# Patient Record
Sex: FEMALE | Race: WHITE | NOT HISPANIC OR LATINO | Employment: OTHER | URBAN - METROPOLITAN AREA
[De-identification: names, ages, dates, MRNs, and addresses within clinical notes are randomized per-mention and may not be internally consistent; named-entity substitution may affect disease eponyms.]

---

## 2017-06-19 ENCOUNTER — APPOINTMENT (OUTPATIENT)
Dept: LAB | Facility: CLINIC | Age: 82
End: 2017-06-19
Payer: MEDICARE

## 2017-06-19 ENCOUNTER — TRANSCRIBE ORDERS (OUTPATIENT)
Dept: LAB | Facility: CLINIC | Age: 82
End: 2017-06-19

## 2017-06-19 DIAGNOSIS — M10.00 GOUTY NEURITIS (HCC): ICD-10-CM

## 2017-06-19 DIAGNOSIS — E55.9 UNSPECIFIED VITAMIN D DEFICIENCY: ICD-10-CM

## 2017-06-19 DIAGNOSIS — M79.10 MYALGIA: ICD-10-CM

## 2017-06-19 DIAGNOSIS — G63 GOUTY NEURITIS (HCC): ICD-10-CM

## 2017-06-19 DIAGNOSIS — E78.2 MIXED HYPERLIPIDEMIA: ICD-10-CM

## 2017-06-19 DIAGNOSIS — E03.4 IDIOPATHIC ATROPHIC HYPOTHYROIDISM: ICD-10-CM

## 2017-06-19 DIAGNOSIS — E78.2 MIXED HYPERLIPIDEMIA: Primary | ICD-10-CM

## 2017-06-19 LAB
25(OH)D3 SERPL-MCNC: 32.4 NG/ML (ref 30–100)
ALBUMIN SERPL BCP-MCNC: 3.8 G/DL (ref 3.5–5)
ALP SERPL-CCNC: 47 U/L (ref 46–116)
ALT SERPL W P-5'-P-CCNC: 22 U/L (ref 12–78)
ANION GAP SERPL CALCULATED.3IONS-SCNC: 9 MMOL/L (ref 4–13)
AST SERPL W P-5'-P-CCNC: 35 U/L (ref 5–45)
BILIRUB SERPL-MCNC: 0.78 MG/DL (ref 0.2–1)
BUN SERPL-MCNC: 14 MG/DL (ref 5–25)
CALCIUM SERPL-MCNC: 10 MG/DL (ref 8.3–10.1)
CHLORIDE SERPL-SCNC: 102 MMOL/L (ref 100–108)
CHOLEST SERPL-MCNC: 232 MG/DL (ref 50–200)
CK MB SERPL-MCNC: 1.3 % (ref 0–2.5)
CK MB SERPL-MCNC: 4.1 NG/ML (ref 0–5)
CK SERPL-CCNC: 304 U/L (ref 26–192)
CO2 SERPL-SCNC: 28 MMOL/L (ref 21–32)
CREAT SERPL-MCNC: 1.22 MG/DL (ref 0.6–1.3)
ERYTHROCYTE [SEDIMENTATION RATE] IN BLOOD: 19 MM/HOUR (ref 0–20)
GFR SERPL CREATININE-BSD FRML MDRD: 42.5 ML/MIN/1.73SQ M
GLUCOSE P FAST SERPL-MCNC: 98 MG/DL (ref 65–99)
HDLC SERPL-MCNC: 61 MG/DL (ref 40–60)
LDLC SERPL CALC-MCNC: 139 MG/DL (ref 0–100)
POTASSIUM SERPL-SCNC: 4.6 MMOL/L (ref 3.5–5.3)
PROT SERPL-MCNC: 8 G/DL (ref 6.4–8.2)
SODIUM SERPL-SCNC: 139 MMOL/L (ref 136–145)
TRIGL SERPL-MCNC: 158 MG/DL
TSH SERPL DL<=0.05 MIU/L-ACNC: 1.79 UIU/ML (ref 0.36–3.74)
URATE SERPL-MCNC: 8.3 MG/DL (ref 2–6.8)

## 2017-06-19 PROCEDURE — 80053 COMPREHEN METABOLIC PANEL: CPT

## 2017-06-19 PROCEDURE — 82550 ASSAY OF CK (CPK): CPT

## 2017-06-19 PROCEDURE — 82553 CREATINE MB FRACTION: CPT

## 2017-06-19 PROCEDURE — 80061 LIPID PANEL: CPT

## 2017-06-19 PROCEDURE — 85652 RBC SED RATE AUTOMATED: CPT

## 2017-06-19 PROCEDURE — 82306 VITAMIN D 25 HYDROXY: CPT

## 2017-06-19 PROCEDURE — 84443 ASSAY THYROID STIM HORMONE: CPT

## 2017-06-19 PROCEDURE — 36415 COLL VENOUS BLD VENIPUNCTURE: CPT

## 2017-06-19 PROCEDURE — 84550 ASSAY OF BLOOD/URIC ACID: CPT

## 2021-03-25 ENCOUNTER — IMMUNIZATIONS (OUTPATIENT)
Dept: FAMILY MEDICINE CLINIC | Facility: HOSPITAL | Age: 86
End: 2021-03-25

## 2021-03-25 DIAGNOSIS — Z23 ENCOUNTER FOR IMMUNIZATION: Primary | ICD-10-CM

## 2021-03-25 PROCEDURE — 91300 SARS-COV-2 / COVID-19 MRNA VACCINE (PFIZER-BIONTECH) 30 MCG: CPT

## 2021-03-25 PROCEDURE — 0001A SARS-COV-2 / COVID-19 MRNA VACCINE (PFIZER-BIONTECH) 30 MCG: CPT

## 2021-04-16 ENCOUNTER — IMMUNIZATIONS (OUTPATIENT)
Dept: FAMILY MEDICINE CLINIC | Facility: HOSPITAL | Age: 86
End: 2021-04-16

## 2021-04-16 DIAGNOSIS — Z23 ENCOUNTER FOR IMMUNIZATION: Primary | ICD-10-CM

## 2021-04-16 PROCEDURE — 0002A SARS-COV-2 / COVID-19 MRNA VACCINE (PFIZER-BIONTECH) 30 MCG: CPT

## 2021-04-16 PROCEDURE — 91300 SARS-COV-2 / COVID-19 MRNA VACCINE (PFIZER-BIONTECH) 30 MCG: CPT

## 2022-02-08 ENCOUNTER — NURSING HOME VISIT (OUTPATIENT)
Dept: GERIATRICS | Facility: OTHER | Age: 87
End: 2022-02-08
Payer: MEDICARE

## 2022-02-08 DIAGNOSIS — C55 UTERINE CARCINOMA (HCC): ICD-10-CM

## 2022-02-08 DIAGNOSIS — N95.0 POSTMENOPAUSAL BLEEDING: Primary | ICD-10-CM

## 2022-02-08 DIAGNOSIS — D62 ACUTE BLOOD LOSS ANEMIA: ICD-10-CM

## 2022-02-08 DIAGNOSIS — N17.9 AKI (ACUTE KIDNEY INJURY) (HCC): ICD-10-CM

## 2022-02-08 DIAGNOSIS — E87.1 HYPONATREMIA: ICD-10-CM

## 2022-02-08 DIAGNOSIS — G61.0 GUILLAIN-BARRE SYNDROME (HCC): ICD-10-CM

## 2022-02-08 DIAGNOSIS — I10 ESSENTIAL HYPERTENSION: ICD-10-CM

## 2022-02-08 DIAGNOSIS — E03.9 HYPOTHYROIDISM, UNSPECIFIED TYPE: ICD-10-CM

## 2022-02-08 PROBLEM — A41.9 SEPSIS (HCC): Status: ACTIVE | Noted: 2022-01-29

## 2022-02-08 PROBLEM — R33.9 URINARY RETENTION: Status: ACTIVE | Noted: 2022-01-27

## 2022-02-08 PROBLEM — N18.9 CKD (CHRONIC KIDNEY DISEASE): Status: ACTIVE | Noted: 2022-02-07

## 2022-02-08 PROBLEM — D63.8 ANEMIA IN OTHER CHRONIC DISEASES CLASSIFIED ELSEWHERE: Status: ACTIVE | Noted: 2022-01-04

## 2022-02-08 PROBLEM — M79.2 NEUROPATHIC PAIN: Status: ACTIVE | Noted: 2021-12-15

## 2022-02-08 PROBLEM — S32.010B: Status: ACTIVE | Noted: 2022-01-05

## 2022-02-08 PROBLEM — I27.20 PULMONARY HYPERTENSION (HCC): Status: ACTIVE | Noted: 2022-02-07

## 2022-02-08 PROBLEM — N30.00 ACUTE CYSTITIS WITHOUT HEMATURIA: Status: ACTIVE | Noted: 2022-01-29

## 2022-02-08 PROCEDURE — 99306 1ST NF CARE HIGH MDM 50: CPT | Performed by: INTERNAL MEDICINE

## 2022-02-08 RX ORDER — POLYETHYLENE GLYCOL 3350 17 G/17G
17 POWDER, FOR SOLUTION ORAL DAILY
COMMUNITY
Start: 2022-02-08

## 2022-02-08 RX ORDER — GABAPENTIN 100 MG/1
1 CAPSULE ORAL EVERY 12 HOURS
COMMUNITY
Start: 2022-01-11

## 2022-02-08 RX ORDER — ACETAMINOPHEN 500 MG
500 TABLET ORAL EVERY 4 HOURS PRN
COMMUNITY
Start: 2022-02-07

## 2022-02-08 RX ORDER — LANOLIN ALCOHOL/MO/W.PET/CERES
100 CREAM (GRAM) TOPICAL DAILY
COMMUNITY
Start: 2022-02-07 | End: 2022-03-09

## 2022-02-08 RX ORDER — AMOXICILLIN 250 MG
2 CAPSULE ORAL
COMMUNITY
Start: 2022-02-07 | End: 2022-03-09

## 2022-02-08 RX ORDER — SOD.CHLORID/POTASSIUM CHLORIDE 287-180-15
904 TABLET ORAL DAILY
COMMUNITY
Start: 2021-12-30

## 2022-02-08 RX ORDER — UBIDECARENONE 75 MG
1 CAPSULE ORAL DAILY
COMMUNITY

## 2022-02-08 RX ORDER — LEVOTHYROXINE SODIUM 0.07 MG/1
75 TABLET ORAL DAILY
COMMUNITY

## 2022-02-08 RX ORDER — AMLODIPINE BESYLATE 5 MG/1
5 TABLET ORAL 2 TIMES DAILY
COMMUNITY
Start: 2022-02-07 | End: 2023-02-07

## 2022-02-08 NOTE — ASSESSMENT & PLAN NOTE
S/p hysterectomy   S/p transfusion  Will monitor CBC   Continue monitoring for signs of bleeding   Surgical sight care

## 2022-02-08 NOTE — ASSESSMENT & PLAN NOTE
Symptoms have improved from Dec  2021 admission with the diagnosis but still some residual weakness left as per report  Rehab consult

## 2022-02-08 NOTE — ASSESSMENT & PLAN NOTE
S/p hysterectomy   Continue follow up with GYN  Clinical stage from 1/26/22 : FIGO stage IVB, calculated stage IIIB

## 2022-02-08 NOTE — PROGRESS NOTES
[facility] Nursing home notes  SHORT TERM REHAB       NAME: Danny Sauceda  AGE: 82 y o  SEX: female    DATE OF ENCOUNTER: 2/8/2022    Assessment and Plan   Postmenopausal bleeding  S/p hysterectomy   S/p transfusion  Will monitor CBC   Continue monitoring for signs of bleeding   Surgical sight care    Uterine carcinoma [facility])  S/p hysterectomy   Continue follow up with GYN  Clinical stage from 1/26/22 : FIGO stage IVB, calculated stage IIIB    Acute blood loss anemia  S/p transfusion  Hb on discharge was 11 2   Continue monitoring     Guillain-Winchester syndrome (HCC)  Symptoms have improved from Dec  2021 admission with the diagnosis but still some residual weakness left as per report  Rehab consult     Essential hypertension  BP slightly elevated, maybe due to pain  Will continue current meds  Continue monitoring BP    JOSE ANGEL (acute kidney injury) ([facility] [address] )  Improved on discharge  Repeat and monitor BMP    Hyponatremia  Improved on discharge  Continue monitoring     Hypothyroidism  Continue synthroid      Chief Complaint     No new complaints    History of Present Illness     83 yo female seen for admission to [facility] after a hospitalization  Patient needed some cueing to get history  She did report that she had hysterectomy and also reports having transfusion after cueing  Called patient son who reported that she went to the hospital due to vaginal bleeding and had hysterectomy and transfusion  She was also managed for UTI  He also reported that she also recently was managed for Guillian Winchester syndrome  Reviewed labs and imaging reports from the hospital records       PMHx     Past Medical History:   Diagnosis Date    Disease of thyroid gland     Guillain Barré syndrome ([address] )     Hypertension     Uterine mass      Past Surgical History:   Procedure Laterality Date    CHOLECYSTECTOMY       Family History   Problem Relation Age of Onset    Heart attack Father      Social History Socioeconomic History    Marital status:      Spouse name: None    Number of children: None    Years of education: None    Highest education level: None   Occupational History    None   Tobacco Use    Smoking status: Former Smoker    Smokeless tobacco: Former User   Substance and Sexual Activity    Alcohol use: Yes    Drug use: Never    Sexual activity: None   Other Topics Concern    None   Social History Narrative    None     Social Determinants of Health     Financial Resource Strain: Not on file   Food Insecurity: Not on file   Transportation Needs: Not on file   Physical Activity: Not on file   Stress: Not on file   Social Connections: Not on file   Intimate Partner Violence: Not on file   Housing Stability: Not on file     No Known Allergies    Review of Systems     Some pain in the abdomen with movement   Constipation   All other review of system negative        Objective   Vital signs:  BP: 152/70  HR: 80  RR: 18  TEMP: 97 9F  SAT : 94% on RA    Done with nursing staff present with patient   PHYSICAL EXAM:  GENERAL: no acute distress  SKIN: no rash, no cyanosis, surgical sight looks clean   HEENT: normocephalic, atraumatic  LUNGS: expanded equally, no chest tenderness   HEART: no JVD, bilateral lower extremity edema trace  ABDOMEN: soft non tender non distended   MUSCULOSKELETAL:  moves all extremities, ROM within normal  NEUROLOGY: awake, alert, Ox3, able to recall 2/3 object  EOMI intact  PSYCH: cooperative, pleasant         Pertinent Laboratory/Diagnostic Studies:  Recent labs and diagnostic tests reviewed in nursing home EMR    Current Medications   Medications reviewed and signed off on nursing home chart

## 2022-02-10 ENCOUNTER — NURSING HOME VISIT (OUTPATIENT)
Dept: GERIATRICS | Facility: OTHER | Age: 87
End: 2022-02-10
Payer: MEDICARE

## 2022-02-10 DIAGNOSIS — E03.9 HYPOTHYROIDISM, UNSPECIFIED TYPE: ICD-10-CM

## 2022-02-10 DIAGNOSIS — N95.0 POSTMENOPAUSAL BLEEDING: Primary | ICD-10-CM

## 2022-02-10 DIAGNOSIS — K59.01 SLOW TRANSIT CONSTIPATION: ICD-10-CM

## 2022-02-10 DIAGNOSIS — N18.31 STAGE 3A CHRONIC KIDNEY DISEASE (HCC): ICD-10-CM

## 2022-02-10 DIAGNOSIS — I10 ESSENTIAL HYPERTENSION: ICD-10-CM

## 2022-02-10 DIAGNOSIS — G61.0 GUILLAIN-BARRE SYNDROME (HCC): ICD-10-CM

## 2022-02-10 PROCEDURE — 99309 SBSQ NF CARE MODERATE MDM 30: CPT | Performed by: NURSE PRACTITIONER

## 2022-02-10 NOTE — PROGRESS NOTES
[facility]  [address] [address]  ([address] [facility]) [address] [facility]   Progress Note  POS 31        NAME: Steven Hanks  AGE: 82 y o  SEX: female  :  1935  DATE OF ENCOUNTER: 2/10/2022     Chief Complaint   Patient seen and examined for follow up on chronic conditions  History of Present Illness      The patient is an 82year old female with multiple comorbidities including but not limited to HTN, CKD, hypothyroidism, and uterine cancer  She is seen today at [facility] for rehabilitation following a hospitalization for abnormal vaginal bleeding  While admitted to the hospital, she underwent a SHARI BSO and pelvic mass resection  Today upon assessment, the patient is resting comfortably in her chair with a complaint of mild abdominal tenderness where her incision is located  Otherwise, the patient's assessment is benign  She denies any headaches, chest pain or SOB, or any nausea or vomiting  Upon examination, it is noted that the patients belly is soft although she does not recall having a BM for a few days  Per nursing, the patient did have a large BM yesterday  The patient is on a bowel regimen  The patient also has a saucedo catheter in place from a failed voiding trial at the hospital  The patient will follow up with urology for further evaluation  Her incision site is clean, dry, and intact and she is scheduled for staples removal on   The patient comes from home alone with a supportive family  The patients grandson was present in the room upon assessment  The following portions of the patient's history were reviewed and updated as appropriate: allergies, current medications, past family history, past medical history, past social history, past surgical history and problem list      Review of Systems      A review of systems was performed  All negative, except as per HPI       History      Past Medical History:   Diagnosis Date    Disease of thyroid gland      Guillain Barré syndrome ([facility] [address] )      Hypertension      Uterine mass        Past Surgical History:   Procedure Laterality Date    CHOLECYSTECTOMY                Family History   Problem Relation Age of Onset    Heart attack Father              Social History      Socioeconomic History    Marital status:        Spouse name: Not on file    Number of children: Not on file    Years of education: Not on file    Highest education level: Not on file   Occupational History    Not on file   Tobacco Use    Smoking status: Former Smoker    Smokeless tobacco: Former User   Substance and Sexual Activity    Alcohol use:  Yes    Drug use: Never    Sexual activity: Not on file   Other Topics Concern    Not on file   Social History Narrative    Not on file            Social Determinants of Health      Financial Resource Strain: Not on file   Food Insecurity: Not on file   Transportation Needs: Not on file   Physical Activity: Not on file   Stress: Not on file   Social Connections: Not on file   Intimate Partner Violence: Not on file   Housing Stability: Not on file      No Known Allergies     Objective      Vital Signs  BP: 128/65   HR: 82   T: 97 8 F    RR: 20      O2Sat: 97% RA    W: 149 3lbs  General: NAD, Well Nourished, Well Developed  Oral: Oropharynx Moist and Clear  Neck: Supple, +ROM  CV: S1, S2, normal rate, regular rhythm, no murmur appreciated  Pulmonary: Lung sounds clear to air, no wheezing, rhonchi, rales  Abdominal:BS + x4 in all quadrants, soft, no mass, mild tenderness with coughing  Extremities: No edema, +ROM, +Strength  Skin: Warm, Dry, no lesions, no rash, no erythema present, ecchymosis present to lower abdomen with surgical incision clean, dry, intact  Neurological: No cranial nerve deficits noted  Psych: Alert and oriented times 3, no mood, no affect, good judgment     Pertinent Laboratory/Diagnostic Studies:    CBC WITH DIFF     HEMOGLOBIN 10 2 g/dL 11 5-14 5 L Final            HEMATOCRIT 30 4 % 35 0-43 0 L Final            WBC 6 3 thou/cmm 4 0-10 0  Final            RBC 3 45 mill/cmm 3 70-4 70 L Final            PLATELET COUNT 713 thou/cmm 140-350 H Final            MPV 6 7 fL 7 5-11 3 L Final            MCV 88 fL   Final            MCH 29 5 pg 26 0-34 0  Final            MCHC 33 6 g/dL 32 0-37 0  Final            RDW 15 4 % 12 0-16 0  Final            DIFFERENTIAL TYPE AUTO    Final            ABSOLUTE NEUT 4 4 thou/cmm 1 8-7 8  Final            ABSOLUTE LYMPH 1 1 thou/cmm 1 0-3 0  Final            ABSOLUTE MONO 0 5 thou/cmm 0 3-1 0  Final            ABSOLUTE EOS 0 2 thou/cmm 0 0-0 5  Final            ABSOLUTE BASO 0 0 thou/cmm 0 0-0 1  Final            NEUTROPHILS 70 %   Final            LYMPHOCYTES 17 %   Final            MONOCYTES 9 %   Final            EOSINOPHILS 3 %   Final            BASOPHILS 1 %   Final          COMP METAB PANEL     GLUCOSE 98 mg/dL 65-99  Final            BUN 13 mg/dL 7-25  Final            CREATININE 0 92 mg/dL 0 40-1 10  Final            SODIUM 138 mmol/L 135-145  Final            POTASSIUM 3 8 mmol/L 3 5-5 2  Final            CHLORIDE 106 mmol/L 100-109  Final            CARBON DIOXIDE 24 mmol/L 23-31  Final            CALCIUM 9 0 mg/dL 8 5-10 1  Final            ALKALINE PHOSPHATASE 39 U/L   Final            ALBUMIN 2 4 g/dL 3 5-4 8 L Final            BILIRUBIN,TOTAL 0 6 mg/dL 0 2-1 0  Final     Use of this assay is not recommended for patients undergoing treatment  with eltrombopag due to the potential for falsely elevated results         PROTEIN, TOTAL 5 5 g/dL 6 3-8 3 L Final            AST 16 U/L <41  Final            ALT 11 U/L <56  Final            ANION GAP 8  3-11  Final            eGFR, NON  AM 57  >60 L Final            eGFR,  AMER 66  >60  Final            eGFR COMMENT (Note)    Final     Units: mL/min per 1 73 square meters                                           Normal Function or Mild Renal    Disease (if clinically at risk): >or=60  Moderately Decreased:                30-59  Severely Decreased:                  15-29  Renal Failure:                         <15                                           Please note that the eGFR is based on the CKD-EPI calculation, and is  not intended to be used for drug dosing  Note: Calculated GFR may not be an accurate indicator of renal  function if the patient's renal function is not in a steady state  Current Medications      Current Medications Reviewed and updated in Nursing Home EMR       Assessment and Plan   Uterine cancer with vaginal bleeding   S/p SHARI BSO and pelvic mass resection   Patient with abdominal incision clean, dry, intact with staples planned for removal 2/17   Minimal complaint of abdominal tenderness   Continue tylenol PRN   Follow up with OBGYN    HTN   /65, stable and controlled   Continue amlodipine at current dose   Monitor BPs as ordered   Electrolytes and renal function within normal range    CKD   Kidney function within normal range   Encourage fluid intake   Avoid nephrotoxins and hypotension    Hypothyroidism   Continue levothyroxine at current dose   Follow up with TSH levels    Constipation   On bowel regimen- miralax and senna daily   Continue prn bowel regime   Large BM noted yesterday       Guillain-Sharon syndrome   Left sided residual weakness (December 2021)   Continue PT/OT   Maintain fall precautions    George Lizarraga, [address] [place] [address]  02/10/2022

## 2022-02-14 ENCOUNTER — NURSING HOME VISIT (OUTPATIENT)
Dept: GERIATRICS | Facility: OTHER | Age: 87
End: 2022-02-14
Payer: MEDICARE

## 2022-02-14 DIAGNOSIS — G61.0 GUILLAIN-BARRE SYNDROME (HCC): ICD-10-CM

## 2022-02-14 DIAGNOSIS — K59.01 SLOW TRANSIT CONSTIPATION: ICD-10-CM

## 2022-02-14 DIAGNOSIS — N18.31 STAGE 3A CHRONIC KIDNEY DISEASE (HCC): ICD-10-CM

## 2022-02-14 DIAGNOSIS — N95.0 POSTMENOPAUSAL BLEEDING: Primary | ICD-10-CM

## 2022-02-14 DIAGNOSIS — I10 ESSENTIAL HYPERTENSION: ICD-10-CM

## 2022-02-14 DIAGNOSIS — R33.9 URINARY RETENTION: ICD-10-CM

## 2022-02-14 DIAGNOSIS — E03.9 HYPOTHYROIDISM, UNSPECIFIED TYPE: ICD-10-CM

## 2022-02-14 PROCEDURE — 99309 SBSQ NF CARE MODERATE MDM 30: CPT | Performed by: NURSE PRACTITIONER

## 2022-02-14 NOTE — PROGRESS NOTES
[facility]  [address]  [id number]  ([address] [facility]) [address] [facility]   Progress Note  POS 31      NAME: Maldonado Shi  AGE: 82 y o  SEX: female  :  1935  DATE OF ENCOUNTER: 2022    Chief Complaint   Patient seen and examined for follow up on chronic conditions  History of Present Illness     The patient is an 82year old female with multiple comorbidities including but not limited to HTN, CKD, hypothyroidism, and uterine cancer  She is seen today at [facility] for rehabilitation following a hospitalization for abnormal vaginal bleeding  While admitted to the hospital, she underwent a SHARI BSO and pelvic mass resection  Today upon assessment, the patient is resting comfortably in her chair with no complaints of pain, only mild abdominal soreness where her incision is located  Otherwise, the patient's assessment is benign  She denies any headaches, chest pain or SOB, or any nausea or vomiting  Although not documented, the patient states that she did have a soft BM yesterday after receiving milk of mag  Her abdomen was soft and nontender upon palpation  The patient is on a bowel regimen  The patient also has a saucedo catheter in place from a failed voiding trial at the hospital  The patient will follow up with urology for further evaluation  Her incision site is clean, dry, and intact and she is scheduled for staples removal on   The patient comes from home alone with a supportive family  Overall, the patient is doing well and looking forward to getting home  The following portions of the patient's history were reviewed and updated as appropriate: allergies, current medications, past family history, past medical history, past social history, past surgical history and problem list     Review of Systems     A review of systems was performed  All negative, except as per HPI      History     Past Medical History:   Diagnosis Date    Disease of thyroid gland     Guillain Barré syndrome ([facility] [address] )     Hypertension     Uterine mass      Past Surgical History:   Procedure Laterality Date    CHOLECYSTECTOMY       Family History   Problem Relation Age of Onset    Heart attack Father      Social History     Socioeconomic History    Marital status:      Spouse name: Not on file    Number of children: Not on file    Years of education: Not on file    Highest education level: Not on file   Occupational History    Not on file   Tobacco Use    Smoking status: Former Smoker    Smokeless tobacco: Former User   Substance and Sexual Activity    Alcohol use:  Yes    Drug use: Never    Sexual activity: Not on file   Other Topics Concern    Not on file   Social History Narrative    Not on file     Social Determinants of Health     Financial Resource Strain: Not on file   Food Insecurity: Not on file   Transportation Needs: Not on file   Physical Activity: Not on file   Stress: Not on file   Social Connections: Not on file   Intimate Partner Violence: Not on file   Housing Stability: Not on file     No Known Allergies    Objective     Vital Signs  BP: 147/72  HR: 69   T: 98 F    RR: 18      O2Sat: 97% RA    W: 144 2lbs  General: NAD, Well Nourished, Well Developed  Oral: Oropharynx Moist and Clear  Neck: Supple, +ROM  CV: S1, S2, normal rate, regular rhythm, no murmur appreciated  Pulmonary: Lung sounds clear to air, no wheezing, rhonchi, rales  Abdominal:BS + x4 in all quadrants, soft, no mass, no tenderness  Extremities: B/l LE trace edema, +ROM, +Strength  Skin: Warm, Dry, no lesions, no rash, no erythema present, no ecchymosis present, lower abdomen surgical incision with staples clean, dry, intact  Neurological: CN 2-12 intact, PERRLA  Psych: Alert and oriented times 3, no mood, no affect, good judgement     Pertinent Laboratory/Diagnostic Studies:    CBC WITH DIFF     HEMOGLOBIN 10 2 g/dL 11 5-14 5 L Final            HEMATOCRIT 30 4 % 35 0-43 0 L Final            WBC 6 3 thou/cmm 4 0-10 0  Final            RBC 3 45 mill/cmm 3 70-4 70 L Final            PLATELET COUNT 929 thou/cmm 140-350 H Final            MPV 6 7 fL 7 5-11 3 L Final            MCV 88 fL   Final            MCH 29 5 pg 26 0-34 0  Final            MCHC 33 6 g/dL 32 0-37 0  Final            RDW 15 4 % 12 0-16 0  Final            DIFFERENTIAL TYPE AUTO    Final            ABSOLUTE NEUT 4 4 thou/cmm 1 8-7 8  Final            ABSOLUTE LYMPH 1 1 thou/cmm 1 0-3 0  Final            ABSOLUTE MONO 0 5 thou/cmm 0 3-1 0  Final            ABSOLUTE EOS 0 2 thou/cmm 0 0-0 5  Final            ABSOLUTE BASO 0 0 thou/cmm 0 0-0 1  Final            NEUTROPHILS 70 %   Final            LYMPHOCYTES 17 %   Final            MONOCYTES 9 %   Final            EOSINOPHILS 3 %   Final            BASOPHILS 1 %   Final          COMP METAB PANEL     GLUCOSE 98 mg/dL 65-99  Final            BUN 13 mg/dL 7-25  Final            CREATININE 0 92 mg/dL 0 40-1 10  Final            SODIUM 138 mmol/L 135-145  Final            POTASSIUM 3 8 mmol/L 3 5-5 2  Final            CHLORIDE 106 mmol/L 100-109  Final            CARBON DIOXIDE 24 mmol/L 23-31  Final            CALCIUM 9 0 mg/dL 8 5-10 1  Final            ALKALINE PHOSPHATASE 39 U/L   Final            ALBUMIN 2 4 g/dL 3 5-4 8 L Final            BILIRUBIN,TOTAL 0 6 mg/dL 0 2-1 0  Final     Use of this assay is not recommended for patients undergoing treatment  with eltrombopag due to the potential for falsely elevated results         PROTEIN, TOTAL 5 5 g/dL 6 3-8 3 L Final            AST 16 U/L <41  Final            ALT 11 U/L <56  Final            ANION GAP 8  3-11  Final            eGFR, NON  AM 57  >60 L Final            eGFR,  AMER 66  >60  Final            eGFR COMMENT (Note)    Final     Units: mL/min per 1 73 square meters                                           Normal Function or Mild Renal    Disease (if clinically at risk):  >or=60  Moderately Decreased: 30-59  Severely Decreased:                  15-29  Renal Failure:                         <15                                           Please note that the eGFR is based on the CKD-EPI calculation, and is  not intended to be used for drug dosing  Note: Calculated GFR may not be an accurate indicator of renal  function if the patient's renal function is not in a steady state  Current Medications     Current Medications Reviewed and updated in Nursing Home EMR      Assessment and Plan     Uterine cancer with vaginal bleeding   S/p SHARI BSO and pelvic mass resection   Patient with abdominal incision clean, dry, intact with staples planned for removal 2/17   No complaints of pain, only mild abdominal soreness   Continue tylenol PRN   Follow up with OBGYN    Urinary retention   Failed voiding trial with saucedo catheter while admitted in the hospital   Continue saucedo catheter   Have patient follow up with urology    HTN   /72, stable and controlled   Continue amlodipine at current dose   Monitor BPs as ordered   Electrolytes and renal function within normal range    CKD   Kidney function within normal range   Encourage fluid intake   Avoid nephrotoxins and hypotension    Hypothyroidism   Continue levothyroxine at current dose   Follow up with TSH levels    Constipation   On bowel regimen- miralax and senna daily   PRNs on board, given milk of mag yesterday per patient   Patient states she did have a decent soft BM yesterday although not documented       Guillain-[place] syndrome   Left sided residual weakness (December 2021)   Continue PT/OT   Maintain fall precautions    [facility]  2/14/2022

## 2022-02-18 ENCOUNTER — NURSING HOME VISIT (OUTPATIENT)
Dept: GERIATRICS | Facility: OTHER | Age: 87
End: 2022-02-18
Payer: MEDICARE

## 2022-02-18 DIAGNOSIS — I10 ESSENTIAL HYPERTENSION: ICD-10-CM

## 2022-02-18 DIAGNOSIS — G61.0 GUILLAIN-BARRE SYNDROME (HCC): ICD-10-CM

## 2022-02-18 DIAGNOSIS — N18.31 STAGE 3A CHRONIC KIDNEY DISEASE (HCC): ICD-10-CM

## 2022-02-18 DIAGNOSIS — N95.0 POSTMENOPAUSAL BLEEDING: Primary | ICD-10-CM

## 2022-02-18 DIAGNOSIS — R33.9 URINARY RETENTION: ICD-10-CM

## 2022-02-18 PROCEDURE — 99309 SBSQ NF CARE MODERATE MDM 30: CPT | Performed by: NURSE PRACTITIONER

## 2022-02-18 NOTE — PROGRESS NOTES
[facility]  [address] [facility] [address]  ([address] [facility]) [facility]   Progress Note  POS 31      NAME: Yareli Osorio  AGE: 82 y o  SEX: female  :  1935  DATE OF ENCOUNTER: 2022    Chief Complaint   Patient seen and examined for follow up on chronic conditions  History of Present Illness     Yareli Osorio is a 82year old female patient with HTN, CKD, hypothyroidism, uterine cancer, urinary retention, guillain-barre syndrome, anemia seen and examined today to follow-up on acute and chronic medical conditions in sub-acute rehab at [facility]  Patient is status post hospitalization for vaginal bleeding and had a hysterectomy  She received a blood transfusion and was treated for a UTI  She was also recently treated for Guillian [place] syndrome  Upon examination, patient is sitting in her chair  She is awake, alert and in no acute distress  She recently came back from the Urologist, passed her voiding trial and had her saucedo catheter removed  She denies difficulty urinating, hematuria, fever, chills, chest pain, sob, abdominal pain, nausea, vomiting, diarrhea, headache, dizziness or visual disturbance  She states that she is experiencing mild incisional tenderness that is currently controlled off medications  The following portions of the patient's history were reviewed and updated as appropriate: allergies, current medications, past family history, past medical history, past social history, past surgical history and problem list     Review of Systems     A review of systems was performed  All negative, except as per HPI      History     Past Medical History:   Diagnosis Date    Disease of thyroid gland     Guillain Barré syndrome ([address] )     Hypertension     Uterine mass      Past Surgical History:   Procedure Laterality Date    CHOLECYSTECTOMY       Family History   Problem Relation Age of Onset    Heart attack Father      Social History     Socioeconomic History    Marital status:      Spouse name: Not on file    Number of children: Not on file    Years of education: Not on file    Highest education level: Not on file   Occupational History    Not on file   Tobacco Use    Smoking status: Former Smoker    Smokeless tobacco: Former User   Substance and Sexual Activity    Alcohol use:  Yes    Drug use: Never    Sexual activity: Not on file   Other Topics Concern    Not on file   Social History Narrative    Not on file     Social Determinants of Health     Financial Resource Strain: Not on file   Food Insecurity: Not on file   Transportation Needs: Not on file   Physical Activity: Not on file   Stress: Not on file   Social Connections: Not on file   Intimate Partner Violence: Not on file   Housing Stability: Not on file     No Known Allergies    Objective     Vital Signs  BP: 118/60     HR: 72 T: 97 9     RR: 18  O2Sat: 95% RA W: 144 2 lbs  General: NAD, Well Nourished, Well Developed  Oral: Oropharynx Moist and Clear  CV: S1, S2, normal rate, regular rhythm, no murmur appreciated  Pulmonary: Lung sounds clear to air, no wheezing, rhonchi, rales  Abdominal:BS + x4 in all quadrants, soft, no mass, no tenderness  Extremities: No edema, +ROM, +Strength  Skin: Warm, Dry, no lesions, no rash, no erythema, abdominal incision c/d/i, steri strips present, no drainage present, no ecchymosis present  Neurological: No cranial nerve deficits  Psych: Alert and oriented times 3, no mood, no affect, good judgement    Pertinent Laboratory/Diagnostic Studies:  02/09/2022  HEMOGLOBIN 10 2 g/dL 11 5-14 5 L Final              HEMATOCRIT 30 4 % 35 0-43 0 L Final             WBC 6 3 thou/cmm 4 0-10 0  Final             RBC 3 45 mill/cmm 3 70-4 70 L Final             PLATELET COUNT 309 thou/cmm 140-350 H Final             MPV 6 7 fL 7 5-11 3 L Final             MCV 88 fL   Final             MCH 29 5 pg 26 0-34 0  Final             MCHC 33 6 g/dL 32 0-37 0  Final             RDW 15 4 % 12 0-16 0  Final             DIFFERENTIAL TYPE AUTO    Final             ABSOLUTE NEUT 4 4 thou/cmm 1 8-7 8  Final             ABSOLUTE LYMPH 1 1 thou/cmm 1 0-3 0  Final             ABSOLUTE MONO 0 5 thou/cmm 0 3-1 0  Final             ABSOLUTE EOS 0 2 thou/cmm 0 0-0 5  Final             ABSOLUTE BASO 0 0 thou/cmm 0 0-0 1  Final             NEUTROPHILS 70 %   Final             LYMPHOCYTES 17 %   Final             MONOCYTES 9 %   Final             EOSINOPHILS 3 %   Final             BASOPHILS 1 %   Final           COMP METAB PANEL               GLUCOSE 98 mg/dL 65-99  Final              BUN 13 mg/dL 7-25  Final             CREATININE 0 92 mg/dL 0 40-1 10  Final             SODIUM 138 mmol/L 135-145  Final             POTASSIUM 3 8 mmol/L 3 5-5 2  Final             CHLORIDE 106 mmol/L 100-109  Final             CARBON DIOXIDE 24 mmol/L 23-31  Final             CALCIUM 9 0 mg/dL 8 5-10 1  Final             ALKALINE PHOSPHATASE 39 U/L   Final             ALBUMIN 2 4 g/dL 3 5-4 8 L Final             BILIRUBIN,TOTAL 0 6 mg/dL 0 2-1 0  Final     Use of this assay is not recommended for patients undergoing treatment   with eltrombopag due to the potential for falsely elevated results         PROTEIN, TOTAL 5 5 g/dL 6 3-8 3 L Final             AST 16 U/L <41  Final             ALT 11 U/L <56  Final             ANION GAP 8  3-11  Final             eGFR, NON  AM 57  >60 L Final                       Current Medications     Current Medications Reviewed and updated in Nursing Home EMR      Assessment and Plan     Postmenopausal bleeding  · Status post hysterectomy  · Incision now with steri-strips with no s/s of infection present  · Continue to monitor for infection  · Will monitor CBC    Essential hypertension  · Blood pressure stable and controlled, today 118/60  · Continue amlodipine 5 mg twice daily  · Will monitor electrolytes and renal function    Guillain-[place] syndrome ([address] )  · Diagnosed in December 2021 with residual left sided weakness  · Continue PT/OT services in sub acute rehab  · Maintain fall precautions    CKD (chronic kidney disease)  Lab Results   Component Value Date    EGFR 42 5 06/19/2017    CREATININE 1 22 06/19/2017   · Creatinine stable at 0 92  · Continue to avoid nephrotoxins and hypotension  · Will monitor BMP    Urinary retention  · Status post follow-up with Urology today  · Patient is reported to have passed her voiding trial and saucedo catheter was removed  · Monitor for urinary retention  · Encourage adequate PO intake  · Follow-up with Urology as outpatient      [address] [facility]  02/18/2022

## 2022-02-19 NOTE — ASSESSMENT & PLAN NOTE
· Diagnosed in December 2021 with residual left sided weakness  · Continue PT/OT services in sub acute rehab  · Maintain fall precautions

## 2022-02-19 NOTE — ASSESSMENT & PLAN NOTE
· Blood pressure stable and controlled, today 118/60  · Continue amlodipine 5 mg twice daily  · Will monitor electrolytes and renal function

## 2022-02-19 NOTE — ASSESSMENT & PLAN NOTE
Lab Results   Component Value Date    EGFR 42 5 06/19/2017    CREATININE 1 22 06/19/2017   · Creatinine stable at 0 92  · Continue to avoid nephrotoxins and hypotension  · Will monitor BMP

## 2022-02-19 NOTE — ASSESSMENT & PLAN NOTE
· Status post follow-up with Urology today  · Patient is reported to have passed her voiding trial and saucedo catheter was removed  · Monitor for urinary retention  · Encourage adequate PO intake  · Follow-up with Urology as outpatient

## 2022-02-19 NOTE — ASSESSMENT & PLAN NOTE
· Status post hysterectomy  · Incision now with steri-strips with no s/s of infection present  · Continue to monitor for infection  · Will monitor CBC

## 2022-02-21 ENCOUNTER — NURSING HOME VISIT (OUTPATIENT)
Dept: GERIATRICS | Facility: OTHER | Age: 87
End: 2022-02-21
Payer: MEDICARE

## 2022-02-21 DIAGNOSIS — N95.0 POSTMENOPAUSAL BLEEDING: Primary | ICD-10-CM

## 2022-02-21 DIAGNOSIS — C55 UTERINE CARCINOMA (HCC): ICD-10-CM

## 2022-02-21 DIAGNOSIS — S32.010D OPEN WEDGE COMPRESSION FRACTURE OF L1 VERTEBRA WITH ROUTINE HEALING, SUBSEQUENT ENCOUNTER: ICD-10-CM

## 2022-02-21 DIAGNOSIS — G61.0 GUILLAIN-BARRE SYNDROME (HCC): ICD-10-CM

## 2022-02-21 DIAGNOSIS — K59.01 SLOW TRANSIT CONSTIPATION: ICD-10-CM

## 2022-02-21 DIAGNOSIS — R33.9 URINARY RETENTION: ICD-10-CM

## 2022-02-21 DIAGNOSIS — N18.31 STAGE 3A CHRONIC KIDNEY DISEASE (HCC): ICD-10-CM

## 2022-02-21 DIAGNOSIS — I10 ESSENTIAL HYPERTENSION: ICD-10-CM

## 2022-02-21 DIAGNOSIS — E03.9 HYPOTHYROIDISM, UNSPECIFIED TYPE: ICD-10-CM

## 2022-02-21 PROCEDURE — 99316 NF DSCHRG MGMT 30 MIN+: CPT | Performed by: NURSE PRACTITIONER

## 2022-02-21 NOTE — PROGRESS NOTES
[facility]  [address] [facility]  [phone]  [facility]   Discharge Summary  POS 31        NAME: Paul Jimenez  AGE: 82 y o  SEX: female  :  1935  DATE OF ENCOUNTER: 2022    Chief Complaint   Patient seen and examined for discharge planning    History of Present Illness     The patient is an 82year old female with multiple comorbidities including but not limited to HTN, CKD, hypothyroidism, and uterine cancer  She is seen today at [facility] for rehabilitation following a hospitalization for abnormal vaginal bleeding  While admitted to the hospital, she underwent a SHARI BSO and pelvic mass resection  Today upon assessment, the patient is resting comfortably in her chair with no complaints of pain, only mild abdominal soreness where her incision is located  Otherwise, the patient's assessment is benign  She denies any headaches, chest pain or SOB, or any nausea or vomiting  She has been having regular BM and has been voiding on her own since her saucedo catheter removal on   On , the patient also had her staples removed  Her incision is clean, dry, and intact with steristrips in place  The patient comes from home alone with a supportive family  Overall, the patient is doing well and looking forward to getting home  She is planned for discharge tomorrow   She states that once she gets up her stairs, everything is all on one level  Patient received comprehensive services during her stay in sub-acute rehab with an overall improvement in her functional status  She is ambulating 150 feet with her roller walker with supervision  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  Her MOCA score was 26/30 on 2022  She will be discharged home tomorrow 2022  Her family is going to provide 24/7 support and a life alert will be provided         The following portions of the patient's history were reviewed and updated as appropriate: allergies, current medications, past family history, past medical history, past social history, past surgical history and problem list     Review of Systems     A review of systems was performed  All negative, except as per HPI  History     Past Medical History:   Diagnosis Date    Disease of thyroid gland     Guillain Barr[place] syndrome ([facility] [address] )     Hypertension     Uterine mass      Past Surgical History:   Procedure Laterality Date    CHOLECYSTECTOMY       Family History   Problem Relation Age of Onset    Heart attack Father      Social History     Socioeconomic History    Marital status:      Spouse name: Not on file    Number of children: Not on file    Years of education: Not on file    Highest education level: Not on file   Occupational History    Not on file   Tobacco Use    Smoking status: Former Smoker    Smokeless tobacco: Former User   Substance and Sexual Activity    Alcohol use:  Yes    Drug use: Never    Sexual activity: Not on file   Other Topics Concern    Not on file   Social History Narrative    Not on file     Social Determinants of Health     Financial Resource Strain: Not on file   Food Insecurity: Not on file   Transportation Needs: Not on file   Physical Activity: Not on file   Stress: Not on file   Social Connections: Not on file   Intimate Partner Violence: Not on file   Housing Stability: Not on file     No Known Allergies    Objective     Vital Signs  BP: 127/59  HR: 78   T: 97 7 F    RR: 20      O2Sat: 92% RA    W: 144 2lbs  General: NAD, Well Nourished, Well Developed  Oral: Oropharynx Moist and Clear  Neck: Supple, +ROM  CV: S1, S2, normal rate, regular rhythm, no murmur appreciated  Pulmonary: Lung sounds clear to air, no wheezing, rhonchi, rales  Abdominal:BS + x4 in all quadrants, soft, no mass, mild lower abdominal tenderness  Extremities: B/l LE trace edema, +ROM, +Strength  Skin: Warm, Dry, no lesions, no rash, no erythema present, no ecchymosis present, lower abdomen surgical incision clean, dry, intact with steristrips in place  Neurological: CN 2-12 intact, PERRLA  Psych: Alert and oriented times 3, no mood, no affect, good judgment       Pertinent Laboratory/Diagnostic Studies:  CBC WITH DIFF     HEMOGLOBIN 10 2 g/dL 11 5-14 5          HEMATOCRIT 30 4 % 35 0-43 0          WBC 6 3 thou/cmm 4 0-10 0          RBC 3 45 mill/cmm 3 70-4 70          PLATELET COUNT 892 thou/cmm 140-350          MPV 6 7 fL 7 5-11 3          MCV 88 fL           MCH 29 5 pg 26 0-34 0          MCHC 33 6 g/dL 32 0-37 0          RDW 15 4 % 12 0-16 0          DIFFERENTIAL TYPE AUTO            ABSOLUTE NEUT 4 4 thou/cmm 1 8-7 8          ABSOLUTE LYMPH 1 1 thou/cmm 1 0-3 0          ABSOLUTE MONO 0 5 thou/cmm 0 3-1 0          ABSOLUTE EOS 0 2 thou/cmm 0 0-0 5          ABSOLUTE BASO 0 0 thou/cmm 0 0-0 1          NEUTROPHILS 70 %           LYMPHOCYTES 17 %           MONOCYTES 9 %           EOSINOPHILS 3 %           BASOPHILS 1 %         COMP METAB PANEL     GLUCOSE 98 mg/dL 65-99          BUN 13 mg/dL 7-25          CREATININE 0 92 mg/dL 0 40-1 10          SODIUM 138 mmol/L 135-145          POTASSIUM 3 8 mmol/L 3 5-5 2          CHLORIDE 106 mmol/L 100-109          CARBON DIOXIDE 24 mmol/L 23-31          CALCIUM 9 0 mg/dL 8 5-10 1          ALKALINE PHOSPHATASE 39 U/L           ALBUMIN 2 4 g/dL 3 5-4 8          BILIRUBIN,TOTAL 0 6 mg/dL 0 2-1 0     Use of this assay is not recommended for patients undergoing treatment  with eltrombopag due to the potential for falsely elevated results       PROTEIN, TOTAL 5 5 g/dL 6 3-8 3          AST 16 U/L <41          ALT 11 U/L <56          ANION GAP 8  3-11          eGFR, NON  AM 57  >60          eGFR,  AMER 66  >60          eGFR COMMENT (Note)       Units: mL/min per 1 73 square meters                                           Normal Function or Mild Renal    Disease (if clinically at risk):  >or=60  Moderately Decreased: 30-59  Severely Decreased:                  15-29  Renal Failure:                         <15                                           Please note that the eGFR is based on the CKD-EPI calculation, and is  not intended to be used for drug dosing  Note: Calculated GFR may not be an accurate indicator of renal  function if the patient's renal function is not in a steady state  Current Medications     Current Medications Reviewed and updated in Nursing Home EMR      Assessment and Plan     Postmenopausal bleeding  · Status post hysterectomy  · Incision now with steri-strips with no s/s of infection present  · Follow-up with Gyn as outpatient    CKD (chronic kidney disease)  Lab Results   Component Value Date    EGFR 42 5 06/19/2017    CREATININE 1 22 06/19/2017   · Creatinine stable at 0 92  · Continue to avoid nephrotoxins and hypotension  · Adequate PO hydration encouraged    Essential hypertension  · Blood pressure stable and controlled, today 127/59  · Continue amlodipine 5 mg twice daily  · Will monitor electrolytes and renal function    Hypothyroidism  · Continue levothyroxine    Guillain-[place] syndrome (HCC)  · Diagnosed in December 2021 with residual left sided weakness  · Continue 24/7 care at home  · Maintain fall precautions    Slow transit constipation  · Stable, continue Miralax and Senna-S  · Recommend adequate hydration and exercise as tolerated    Open wedge compression fracture of first lumbar vertebra (HCC)  · History of chronic compression fracture at L1   · MRI shows possible compression of L5 nerve roots bilaterally  · No reported issues with increased numbness or lower extremity radicular symptoms  · Follow-up with Neurosurgery and pain management prn    Urinary retention  · Status post follow-up with Urology   · Patient is reported to have passed her voiding trial and saucedo catheter was removed  · Monitor for urinary retention  · Encourage adequate PO intake  · Follow-up with Urology as outpatient    Uterine carcinoma [facility])  · Status post hysterectomy  · Clinical stage from 01/26/22: FIGO stage IVB, calculated stage IIIB  · Follow-up with GYN      Discussion with patient/family and further instructions:  -Fall precautions  -Aspiration precautions  -Bleeding precautions  -Monitor for signs/symptoms of infection  -Medication list was reviewed and signed  -DME form was completed     Follow-up Recommendations: Please follow-up with your primary care physician within 7-10 days of discharge to review medication changes and current status  Problem List Follow-up Recommendations:  I have spent 40 minutes with Patient /Family today in which greater than 50% of this time was spent in counseling/coordination of care       [address] [place] [address]  Geriatric Medicine  02/21/2022

## 2022-02-22 NOTE — ASSESSMENT & PLAN NOTE
· Status post follow-up with Urology   · Patient is reported to have passed her voiding trial and saucedo catheter was removed  · Monitor for urinary retention  · Encourage adequate PO intake  · Follow-up with Urology as outpatient

## 2022-02-22 NOTE — ASSESSMENT & PLAN NOTE
Lab Results   Component Value Date    EGFR 42 5 06/19/2017    CREATININE 1 22 06/19/2017   · Creatinine stable at 0 92  · Continue to avoid nephrotoxins and hypotension  · Adequate PO hydration encouraged

## 2022-02-22 NOTE — ASSESSMENT & PLAN NOTE
· Blood pressure stable and controlled, today 127/59  · Continue amlodipine 5 mg twice daily  · Will monitor electrolytes and renal function

## 2022-02-22 NOTE — ASSESSMENT & PLAN NOTE
· Status post hysterectomy  · Incision now with steri-strips with no s/s of infection present  · Follow-up with Gyn as outpatient

## 2022-02-22 NOTE — ASSESSMENT & PLAN NOTE
· Diagnosed in December 2021 with residual left sided weakness  · Continue 24/7 care at home  · Maintain fall precautions

## 2022-02-22 NOTE — ASSESSMENT & PLAN NOTE
· Status post hysterectomy  · Clinical stage from 01/26/22: FIGO stage IVB, calculated stage IIIB  · Follow-up with GYN

## 2022-02-22 NOTE — ASSESSMENT & PLAN NOTE
· History of chronic compression fracture at L1   · MRI shows possible compression of L5 nerve roots bilaterally  · No reported issues with increased numbness or lower extremity radicular symptoms  · Follow-up with Neurosurgery and pain management prn

## 2022-10-12 PROBLEM — N30.00 ACUTE CYSTITIS WITHOUT HEMATURIA: Status: RESOLVED | Noted: 2022-01-29 | Resolved: 2022-10-12

## 2022-10-12 PROBLEM — A41.9 SEPSIS (HCC): Status: RESOLVED | Noted: 2022-01-29 | Resolved: 2022-10-12

## 2023-05-19 ENCOUNTER — APPOINTMENT (OUTPATIENT)
Dept: LAB | Age: 88
End: 2023-05-19

## 2023-05-19 DIAGNOSIS — R31.9 HEMATURIA SYNDROME: ICD-10-CM

## 2023-05-21 LAB
BACTERIA UR CULT: ABNORMAL
BACTERIA UR CULT: ABNORMAL

## 2023-05-23 LAB
BACTERIA UR CULT: ABNORMAL
BACTERIA UR CULT: ABNORMAL

## 2023-10-24 ENCOUNTER — LAB REQUISITION (OUTPATIENT)
Dept: LAB | Facility: HOSPITAL | Age: 88
End: 2023-10-24
Payer: MEDICARE

## 2023-10-24 DIAGNOSIS — N39.0 URINARY TRACT INFECTION, SITE NOT SPECIFIED: ICD-10-CM

## 2023-10-24 LAB
BACTERIA UR QL AUTO: ABNORMAL /HPF
BILIRUB UR QL STRIP: NEGATIVE
BILIRUB UR QL STRIP: NEGATIVE
CLARITY UR: ABNORMAL
CLARITY UR: ABNORMAL
COLOR UR: ABNORMAL
COLOR UR: ABNORMAL
GLUCOSE UR STRIP-MCNC: ABNORMAL MG/DL
GLUCOSE UR STRIP-MCNC: ABNORMAL MG/DL
GRAN CASTS #/AREA URNS LPF: ABNORMAL /[LPF]
HGB UR QL STRIP.AUTO: ABNORMAL
HGB UR QL STRIP.AUTO: ABNORMAL
KETONES UR STRIP-MCNC: NEGATIVE MG/DL
KETONES UR STRIP-MCNC: NEGATIVE MG/DL
LEUKOCYTE ESTERASE UR QL STRIP: ABNORMAL
LEUKOCYTE ESTERASE UR QL STRIP: ABNORMAL
MUCOUS THREADS UR QL AUTO: ABNORMAL
NITRITE UR QL STRIP: NEGATIVE
NITRITE UR QL STRIP: NEGATIVE
NON-SQ EPI CELLS URNS QL MICRO: ABNORMAL /HPF
PH UR STRIP.AUTO: 6 [PH]
PH UR STRIP.AUTO: 6 [PH]
PROT UR STRIP-MCNC: ABNORMAL MG/DL
PROT UR STRIP-MCNC: ABNORMAL MG/DL
RBC #/AREA URNS AUTO: ABNORMAL /HPF
SP GR UR STRIP.AUTO: 1.02 (ref 1–1.03)
SP GR UR STRIP.AUTO: 1.02 (ref 1–1.03)
UROBILINOGEN UR STRIP-ACNC: <2 MG/DL
UROBILINOGEN UR STRIP-ACNC: <2 MG/DL
WBC #/AREA URNS AUTO: ABNORMAL /HPF
WBC CLUMPS # UR AUTO: PRESENT /UL

## 2023-10-24 PROCEDURE — 81001 URINALYSIS AUTO W/SCOPE: CPT | Performed by: NURSE PRACTITIONER

## 2023-11-16 ENCOUNTER — APPOINTMENT (OUTPATIENT)
Dept: LAB | Facility: IMAGING CENTER | Age: 88
End: 2023-11-16
Payer: MEDICARE

## 2023-11-16 DIAGNOSIS — I10 ESSENTIAL HYPERTENSION, BENIGN: ICD-10-CM

## 2023-11-16 LAB
ALBUMIN SERPL BCP-MCNC: 3.6 G/DL (ref 3.5–5)
ALP SERPL-CCNC: 30 U/L (ref 34–104)
ALT SERPL W P-5'-P-CCNC: 6 U/L (ref 7–52)
ANION GAP SERPL CALCULATED.3IONS-SCNC: 10 MMOL/L
AST SERPL W P-5'-P-CCNC: 18 U/L (ref 13–39)
BASOPHILS # BLD AUTO: 0.06 THOUSANDS/ÂΜL (ref 0–0.1)
BASOPHILS NFR BLD AUTO: 1 % (ref 0–1)
BILIRUB SERPL-MCNC: 0.69 MG/DL (ref 0.2–1)
BUN SERPL-MCNC: 15 MG/DL (ref 5–25)
CALCIUM SERPL-MCNC: 9.6 MG/DL (ref 8.4–10.2)
CHLORIDE SERPL-SCNC: 104 MMOL/L (ref 96–108)
CO2 SERPL-SCNC: 26 MMOL/L (ref 21–32)
CREAT SERPL-MCNC: 1.27 MG/DL (ref 0.6–1.3)
EOSINOPHIL # BLD AUTO: 0.21 THOUSAND/ÂΜL (ref 0–0.61)
EOSINOPHIL NFR BLD AUTO: 3 % (ref 0–6)
ERYTHROCYTE [DISTWIDTH] IN BLOOD BY AUTOMATED COUNT: 14.6 % (ref 11.6–15.1)
GFR SERPL CREATININE-BSD FRML MDRD: 38 ML/MIN/1.73SQ M
GLUCOSE P FAST SERPL-MCNC: 94 MG/DL (ref 65–99)
HCT VFR BLD AUTO: 35.5 % (ref 34.8–46.1)
HGB BLD-MCNC: 11.2 G/DL (ref 11.5–15.4)
IMM GRANULOCYTES # BLD AUTO: 0.04 THOUSAND/UL (ref 0–0.2)
IMM GRANULOCYTES NFR BLD AUTO: 1 % (ref 0–2)
LYMPHOCYTES # BLD AUTO: 2.19 THOUSANDS/ÂΜL (ref 0.6–4.47)
LYMPHOCYTES NFR BLD AUTO: 27 % (ref 14–44)
MCH RBC QN AUTO: 30 PG (ref 26.8–34.3)
MCHC RBC AUTO-ENTMCNC: 31.5 G/DL (ref 31.4–37.4)
MCV RBC AUTO: 95 FL (ref 82–98)
MONOCYTES # BLD AUTO: 0.68 THOUSAND/ÂΜL (ref 0.17–1.22)
MONOCYTES NFR BLD AUTO: 8 % (ref 4–12)
NEUTROPHILS # BLD AUTO: 4.99 THOUSANDS/ÂΜL (ref 1.85–7.62)
NEUTS SEG NFR BLD AUTO: 60 % (ref 43–75)
NRBC BLD AUTO-RTO: 0 /100 WBCS
PLATELET # BLD AUTO: 210 THOUSANDS/UL (ref 149–390)
PMV BLD AUTO: 9.5 FL (ref 8.9–12.7)
POTASSIUM SERPL-SCNC: 4.3 MMOL/L (ref 3.5–5.3)
PROT SERPL-MCNC: 6.8 G/DL (ref 6.4–8.4)
RBC # BLD AUTO: 3.73 MILLION/UL (ref 3.81–5.12)
SODIUM SERPL-SCNC: 140 MMOL/L (ref 135–147)
WBC # BLD AUTO: 8.17 THOUSAND/UL (ref 4.31–10.16)

## 2023-11-16 PROCEDURE — 85025 COMPLETE CBC W/AUTO DIFF WBC: CPT

## 2023-11-16 PROCEDURE — 36415 COLL VENOUS BLD VENIPUNCTURE: CPT

## 2023-11-16 PROCEDURE — 80053 COMPREHEN METABOLIC PANEL: CPT

## 2023-12-06 ENCOUNTER — LAB REQUISITION (OUTPATIENT)
Dept: LAB | Facility: HOSPITAL | Age: 88
End: 2023-12-06
Payer: MEDICARE

## 2023-12-06 DIAGNOSIS — N39.0 URINARY TRACT INFECTION, SITE NOT SPECIFIED: ICD-10-CM

## 2023-12-06 LAB
AMORPH URATE CRY URNS QL MICRO: ABNORMAL
BACTERIA UR QL AUTO: ABNORMAL /HPF
BILIRUB UR QL STRIP: NEGATIVE
CAOX CRY URNS QL MICRO: ABNORMAL /HPF
CLARITY UR: ABNORMAL
COLOR UR: ABNORMAL
GLUCOSE UR STRIP-MCNC: NEGATIVE MG/DL
HGB UR QL STRIP.AUTO: ABNORMAL
KETONES UR STRIP-MCNC: NEGATIVE MG/DL
LEUKOCYTE ESTERASE UR QL STRIP: ABNORMAL
MUCOUS THREADS UR QL AUTO: ABNORMAL
NITRITE UR QL STRIP: POSITIVE
NON-SQ EPI CELLS URNS QL MICRO: ABNORMAL /HPF
PH UR STRIP.AUTO: 6 [PH]
PROT UR STRIP-MCNC: ABNORMAL MG/DL
RBC #/AREA URNS AUTO: ABNORMAL /HPF
SP GR UR STRIP.AUTO: 1.03 (ref 1–1.03)
UROBILINOGEN UR STRIP-ACNC: <2 MG/DL
WBC #/AREA URNS AUTO: ABNORMAL /HPF
WBC CLUMPS # UR AUTO: PRESENT /UL

## 2023-12-06 PROCEDURE — 87147 CULTURE TYPE IMMUNOLOGIC: CPT | Performed by: NURSE PRACTITIONER

## 2023-12-06 PROCEDURE — 87077 CULTURE AEROBIC IDENTIFY: CPT | Performed by: NURSE PRACTITIONER

## 2023-12-06 PROCEDURE — 87086 URINE CULTURE/COLONY COUNT: CPT | Performed by: NURSE PRACTITIONER

## 2023-12-06 PROCEDURE — 81001 URINALYSIS AUTO W/SCOPE: CPT | Performed by: NURSE PRACTITIONER

## 2023-12-06 PROCEDURE — 87186 SC STD MICRODIL/AGAR DIL: CPT | Performed by: NURSE PRACTITIONER

## 2023-12-09 LAB — BACTERIA UR CULT: ABNORMAL

## 2024-02-29 ENCOUNTER — APPOINTMENT (OUTPATIENT)
Dept: LAB | Facility: IMAGING CENTER | Age: 89
End: 2024-02-29
Payer: MEDICARE

## 2024-02-29 DIAGNOSIS — N93.9 VAGINAL BLEEDING: ICD-10-CM

## 2024-02-29 LAB
ERYTHROCYTE [DISTWIDTH] IN BLOOD BY AUTOMATED COUNT: 16.9 % (ref 11.6–15.1)
HCT VFR BLD AUTO: 32.9 % (ref 34.8–46.1)
HGB BLD-MCNC: 10.7 G/DL (ref 11.5–15.4)
MCH RBC QN AUTO: 31.1 PG (ref 26.8–34.3)
MCHC RBC AUTO-ENTMCNC: 32.5 G/DL (ref 31.4–37.4)
MCV RBC AUTO: 96 FL (ref 82–98)
PLATELET # BLD AUTO: 421 THOUSANDS/UL (ref 149–390)
PMV BLD AUTO: 9.7 FL (ref 8.9–12.7)
RBC # BLD AUTO: 3.44 MILLION/UL (ref 3.81–5.12)
WBC # BLD AUTO: 17.45 THOUSAND/UL (ref 4.31–10.16)

## 2024-02-29 PROCEDURE — 36415 COLL VENOUS BLD VENIPUNCTURE: CPT

## 2024-02-29 PROCEDURE — 85027 COMPLETE CBC AUTOMATED: CPT

## 2024-03-13 ENCOUNTER — LAB REQUISITION (OUTPATIENT)
Dept: LAB | Facility: HOSPITAL | Age: 89
End: 2024-03-13
Payer: MEDICARE

## 2024-03-13 DIAGNOSIS — N39.0 URINARY TRACT INFECTION, SITE NOT SPECIFIED: ICD-10-CM

## 2024-03-13 LAB
AMORPH URATE CRY URNS QL MICRO: ABNORMAL
BACTERIA UR QL AUTO: ABNORMAL /HPF
BILIRUB UR QL STRIP: NEGATIVE
CAOX CRY URNS QL MICRO: ABNORMAL /HPF
CLARITY UR: ABNORMAL
COLOR UR: YELLOW
GLUCOSE UR STRIP-MCNC: NEGATIVE MG/DL
HGB UR QL STRIP.AUTO: ABNORMAL
KETONES UR STRIP-MCNC: ABNORMAL MG/DL
LEUKOCYTE ESTERASE UR QL STRIP: ABNORMAL
MUCOUS THREADS UR QL AUTO: ABNORMAL
NITRITE UR QL STRIP: POSITIVE
NON-SQ EPI CELLS URNS QL MICRO: ABNORMAL /HPF
PH UR STRIP.AUTO: 7 [PH]
PROT UR STRIP-MCNC: ABNORMAL MG/DL
RBC #/AREA URNS AUTO: ABNORMAL /HPF
SP GR UR STRIP.AUTO: 1.02 (ref 1–1.03)
UROBILINOGEN UR STRIP-ACNC: <2 MG/DL
WBC #/AREA URNS AUTO: ABNORMAL /HPF
WBC CLUMPS # UR AUTO: PRESENT /UL

## 2024-03-13 PROCEDURE — 87086 URINE CULTURE/COLONY COUNT: CPT | Performed by: NURSE PRACTITIONER

## 2024-03-13 PROCEDURE — 81001 URINALYSIS AUTO W/SCOPE: CPT | Performed by: NURSE PRACTITIONER

## 2024-03-14 LAB — BACTERIA UR CULT: NORMAL

## 2024-03-27 ENCOUNTER — LAB REQUISITION (OUTPATIENT)
Dept: LAB | Facility: HOSPITAL | Age: 89
End: 2024-03-27
Payer: MEDICARE

## 2024-03-27 DIAGNOSIS — N39.0 URINARY TRACT INFECTION, SITE NOT SPECIFIED: ICD-10-CM

## 2024-03-27 LAB
BACTERIA UR QL AUTO: ABNORMAL /HPF
BILIRUB UR QL STRIP: NEGATIVE
CLARITY UR: ABNORMAL
COLOR UR: ABNORMAL
GLUCOSE UR STRIP-MCNC: NEGATIVE MG/DL
HGB UR QL STRIP.AUTO: ABNORMAL
KETONES UR STRIP-MCNC: NEGATIVE MG/DL
LEUKOCYTE ESTERASE UR QL STRIP: ABNORMAL
NITRITE UR QL STRIP: POSITIVE
NON-SQ EPI CELLS URNS QL MICRO: ABNORMAL /HPF
PH UR STRIP.AUTO: 6 [PH]
PROT UR STRIP-MCNC: ABNORMAL MG/DL
RBC #/AREA URNS AUTO: ABNORMAL /HPF
SP GR UR STRIP.AUTO: 1.02 (ref 1–1.03)
UROBILINOGEN UR STRIP-ACNC: <2 MG/DL
WBC #/AREA URNS AUTO: ABNORMAL /HPF
WBC CLUMPS # UR AUTO: PRESENT /UL

## 2024-03-27 PROCEDURE — 87147 CULTURE TYPE IMMUNOLOGIC: CPT | Performed by: NURSE PRACTITIONER

## 2024-03-27 PROCEDURE — 87086 URINE CULTURE/COLONY COUNT: CPT | Performed by: NURSE PRACTITIONER

## 2024-03-27 PROCEDURE — 87077 CULTURE AEROBIC IDENTIFY: CPT | Performed by: NURSE PRACTITIONER

## 2024-03-27 PROCEDURE — 87186 SC STD MICRODIL/AGAR DIL: CPT | Performed by: NURSE PRACTITIONER

## 2024-03-27 PROCEDURE — 81001 URINALYSIS AUTO W/SCOPE: CPT | Performed by: NURSE PRACTITIONER

## 2024-03-29 LAB
BACTERIA UR CULT: ABNORMAL
BACTERIA UR CULT: ABNORMAL